# Patient Record
Sex: FEMALE | Race: BLACK OR AFRICAN AMERICAN | NOT HISPANIC OR LATINO | Employment: FULL TIME | URBAN - METROPOLITAN AREA
[De-identification: names, ages, dates, MRNs, and addresses within clinical notes are randomized per-mention and may not be internally consistent; named-entity substitution may affect disease eponyms.]

---

## 2021-12-07 ENCOUNTER — OFFICE VISIT (OUTPATIENT)
Dept: ORTHOPEDIC SURGERY | Facility: CLINIC | Age: 61
End: 2021-12-07

## 2021-12-07 VITALS — HEIGHT: 63 IN | BODY MASS INDEX: 42.7 KG/M2 | TEMPERATURE: 97.8 F | WEIGHT: 241 LBS

## 2021-12-07 DIAGNOSIS — R52 PAIN: Primary | ICD-10-CM

## 2021-12-07 PROCEDURE — 99203 OFFICE O/P NEW LOW 30 MIN: CPT | Performed by: ORTHOPAEDIC SURGERY

## 2021-12-07 PROCEDURE — 73501 X-RAY EXAM HIP UNI 1 VIEW: CPT | Performed by: ORTHOPAEDIC SURGERY

## 2021-12-07 RX ORDER — OMEPRAZOLE 40 MG/1
CAPSULE, DELAYED RELEASE ORAL
COMMUNITY
Start: 2021-09-24

## 2021-12-07 RX ORDER — QUINIDINE GLUCONATE 324 MG
240 TABLET, EXTENDED RELEASE ORAL 3 TIMES DAILY
COMMUNITY

## 2021-12-07 NOTE — PROGRESS NOTES
"Patient: Valentina Hopkins  YOB: 1960 61 y.o. female  Medical Record Number: [id number]    Chief Complaints:   Chief Complaint   Patient presents with   • Left Hip - new patient, Pain       History of Present Illness:Valentina Hopkins is a 61 y.o. female who presents with left lateral hip pain she has had total hip replacements performed in [place] 20 years ago.  She has had intermittent episodes with moderate to at times severe ache about the lateral aspect of the left hip hurts when she lays on her side.  Overall over the last month or so most soreness has improved.  She describes only mild intermittent ache currently.    Allergies:   Allergies   Allergen Reactions   • Hydrocodone-Acetaminophen Itching     [facility]ner Allergy Text Annotation: Vicodin; pt takes norco t candelario well  with no problems per pt   • Hydrocodone Itching   • Penicillins Itching       Medications:   Current Outpatient Medications   Medication Sig Dispense Refill   • CALCIUM & MAGNESIUM CARBONATES PO Take  by mouth.     • Cholecalciferol 25 MCG (1000 UT) capsule Take 2,000 Units by mouth Daily.     • ferrous gluconate (FERGON) 240 (27 FE) MG tablet Take 240 mg by mouth 3 (Three) Times a Day.     • MULTIPLE VITAMINS-MINERALS ER PO Take  by mouth.     • omeprazole (priLOSEC) 40 MG capsule        No current facility-administered medications for this visit.         The following portions of the patient's history were reviewed and updated as appropriate: allergies, current medications, past family history, past medical history, past social history, past surgical history and problem list.    Review of Systems:   A 14 point review of systems was performed. All systems negative except pertinent positives/negative listed in HPI above    Physical Exam:   Vitals:    12/07/21 1433   Temp: 97.8 °F (36.6 °C)   Weight: 109 kg (241 lb)   Height: 158.8 cm (62.5\")       General: A and O x 3, ASA, NAD    SCLERA:    Normal    DENTITION:   Normal   Hip:  " left    LEG ALIGNMENT:     Neutral   ,    equal leg lengths    GAIT:     Nonantalgic    SKIN:     No abnormality    RANGE OF MOTION:      Full without joint irritability    STRENGTH:     5 / 5    hip flexion and abduction    DISTAL PULSES:    Paplable    DISTAL SENSATION :   Intact    LYMPHATICS:     No   lymphadenopathy    OTHER:          - Negative Stinchfeld test      - Negative log roll      +Tenderness to palpation trochanteric bursa       Radiology:  Xrays 2views left hip (AP bilateral hips, and lateral of the hip) ordered and reviewed for evaluation of hip pain  demonstrating  a well positioned total hip without evidence of  loosening, or osteolysis.  There is mild polywear of the right greater than left hip.  There are no previous films for comparison.    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  I think most of her symptoms probably arise from hip bursitis on the left side.  I recommended hip fascia stretching and weight management.  If her symptoms return we could consider an injection in the left hip bursa.      Mundo Blair MD  12/7/2021

## 2021-12-23 ENCOUNTER — OFFICE VISIT (OUTPATIENT)
Dept: ORTHOPEDIC SURGERY | Facility: CLINIC | Age: 61
End: 2021-12-23

## 2021-12-23 VITALS — HEIGHT: 63 IN | WEIGHT: 240 LBS | TEMPERATURE: 97.7 F | BODY MASS INDEX: 42.52 KG/M2

## 2021-12-23 DIAGNOSIS — M70.62 GREATER TROCHANTERIC BURSITIS OF LEFT HIP: Primary | ICD-10-CM

## 2021-12-23 PROCEDURE — 99213 OFFICE O/P EST LOW 20 MIN: CPT | Performed by: ORTHOPAEDIC SURGERY

## 2021-12-24 PROCEDURE — 20610 DRAIN/INJ JOINT/BURSA W/O US: CPT | Performed by: ORTHOPAEDIC SURGERY

## 2021-12-24 RX ORDER — METHYLPREDNISOLONE ACETATE 80 MG/ML
80 INJECTION, SUSPENSION INTRA-ARTICULAR; INTRALESIONAL; INTRAMUSCULAR; SOFT TISSUE
Status: COMPLETED | OUTPATIENT
Start: 2021-12-24 | End: 2021-12-24

## 2021-12-24 RX ADMIN — METHYLPREDNISOLONE ACETATE 80 MG: 80 INJECTION, SUSPENSION INTRA-ARTICULAR; INTRALESIONAL; INTRAMUSCULAR; SOFT TISSUE at 08:24

## 2021-12-24 NOTE — PROGRESS NOTES
"Patient: Valentina Hopkins  YOB: 1960 61 y.o. female  Medical Record Number: [id number]    Chief Complaint:   Chief Complaint   Patient presents with   • Left Hip - Follow-up, Pain       History of Present Illness:Valentina Hopkins is a 61 y.o. female who presents for follow-up of  left lateral hip pain she has had total hip replacements performed in [place] 20 years ago.  She has had intermittent episodes with moderate to at times severe ache about the lateral aspect of the left hip hurts when she lays on her side.  Overall over the last month or so most soreness has improved.  She describes moderate progressive ache.        Allergies:   Allergies   Allergen Reactions   • Hydrocodone-Acetaminophen Itching     Cerner Allergy Text Annotation: Vicodin; pt takes norco t candelario well  with no problems per pt   • Hydrocodone Itching   • Penicillins Itching       Medications:   Current Outpatient Medications   Medication Sig Dispense Refill   • CALCIUM & MAGNESIUM CARBONATES PO Take  by mouth.     • Cholecalciferol 25 MCG (1000 UT) capsule Take 2,000 Units by mouth Daily.     • ferrous gluconate (FERGON) 240 (27 FE) MG tablet Take 240 mg by mouth 3 (Three) Times a Day.     • MULTIPLE VITAMINS-MINERALS ER PO Take  by mouth.     • omeprazole (priLOSEC) 40 MG capsule        No current facility-administered medications for this visit.         The following portions of the patient's history were reviewed and updated as appropriate: allergies, current medications, past family history, past medical history, past social history, past surgical history and problem list.    Review of Systems:   A 14 point review of systems was performed. All systems negative except pertinent positives/negative listed in HPI above    Physical Exam:   Vitals:    12/23/21 1633   Temp: 97.7 °F (36.5 °C)   Weight: 109 kg (240 lb)   Height: 160 cm (63\")       General: A and O x 3, ASA, NAD    SCLERA:    Normal    DENTITION:   Normal  Hip:  " left    LEG ALIGNMENT:     Neutral   ,    equal leg lengths    GAIT:     Nonantalgic    SKIN:     No abnormality    RANGE OF MOTION:      Full without joint irritability    STRENGTH:     5 / 5    hip flexion and abduction    DISTAL PULSES:    Paplable    DISTAL SENSATION :   Intact    LYMPHATICS:     No   lymphadenopathy    OTHER:          - Negative Stinchfeld test      - Negative log roll      +Tenderness to palpation trochanteric bursa     Radiology:    Xrays 2views left hip (AP bilateral hips and lateral hip) taken previously demonstrating a well positioned total hip without evidence of wear, loosening, or osteolysis      Assessment/Plan:  Left lateral hip pain -  Appears most likely related to trochanteric bursitis- injected, send to pt, rto prn  Large Joint Arthrocentesis: L greater trochanteric bursa  Date/Time: 12/24/2021 8:24 AM  Consent given by: patient  Site marked: site marked  Timeout: Immediately prior to procedure a time out was called to verify the correct patient, procedure, equipment, support staff and site/side marked as required   Supporting Documentation  Indications: pain   Procedure Details  Location: hip - L greater trochanteric bursa  Needle size: 22 G  Approach: lateral  Medications administered: 80 mg methylPREDNISolone acetate 80 MG/ML; 2 mL lidocaine (cardiac)                Mundo Blair MD  12/24/2021